# Patient Record
Sex: FEMALE | URBAN - METROPOLITAN AREA
[De-identification: names, ages, dates, MRNs, and addresses within clinical notes are randomized per-mention and may not be internally consistent; named-entity substitution may affect disease eponyms.]

---

## 2023-07-10 ENCOUNTER — APPOINTMENT (RX ONLY)
Dept: URBAN - METROPOLITAN AREA CLINIC 144 | Facility: CLINIC | Age: 63
Setting detail: DERMATOLOGY
End: 2023-07-10

## 2023-07-10 DIAGNOSIS — L65.9 NONSCARRING HAIR LOSS, UNSPECIFIED: ICD-10-CM

## 2023-07-10 PROCEDURE — ? ORDER TESTS

## 2023-07-10 PROCEDURE — ? COUNSELING

## 2023-07-10 PROCEDURE — ? PRESCRIPTION MEDICATION MANAGEMENT

## 2023-07-10 PROCEDURE — 99204 OFFICE O/P NEW MOD 45 MIN: CPT

## 2023-07-10 ASSESSMENT — LOCATION ZONE DERM: LOCATION ZONE: SCALP

## 2023-07-10 ASSESSMENT — LOCATION SIMPLE DESCRIPTION DERM: LOCATION SIMPLE: ANTERIOR SCALP

## 2023-07-10 ASSESSMENT — LOCATION DETAILED DESCRIPTION DERM: LOCATION DETAILED: MID-FRONTAL SCALP

## 2023-07-10 NOTE — PROCEDURE: ORDER TESTS
Performing Laboratory: 0
Billing Type: Third-Party Bill
Expected Date Of Service: 07/10/2023
Bill For Surgical Tray: no

## 2023-07-10 NOTE — PROCEDURE: PRESCRIPTION MEDICATION MANAGEMENT
Plan: Discussed need of blood work \\n\\nPt states that she has family history of hair thinning, although each family member is different \\n\\nPt states that her hair has different texture\\n\\nPt states that she is a cash pay pt \\n\\nPt states that she has seen improvement with her treatment regimen \\n\\nPt states that her last PRP treatment was about 6 weeks ago\\n\\nDiscussed risk/benefit of ILK \\n\\nDiscussed risk/benefit of nutrafol supplement topical\\n\\nDiscussed that ILK can begin if blood work is WNL \\n\\nPt states that she is not interested in ILK\\n\\nDiscussed that DHT blocker can begin if blood work is WNL\\n\\nLabs req given to pt
Initiate Treatment: -start otc nutrafol supplement topical
Detail Level: Zone
Render In Strict Bullet Format?: No

## 2023-07-10 NOTE — HPI: HAIR LOSS
What Hair Products Do You Use?: PT INTERESTED IN DHT BLOCKER \\nWants to get off of topical minoxidil \\nPt currently doing PRP